# Patient Record
Sex: MALE | Race: WHITE | NOT HISPANIC OR LATINO | URBAN - METROPOLITAN AREA
[De-identification: names, ages, dates, MRNs, and addresses within clinical notes are randomized per-mention and may not be internally consistent; named-entity substitution may affect disease eponyms.]

---

## 2023-10-04 ENCOUNTER — OFFICE VISIT (OUTPATIENT)
Dept: URGENT CARE | Facility: URGENT CARE | Age: 33
End: 2023-10-04
Payer: MEDICAID

## 2023-10-04 VITALS
HEART RATE: 61 BPM | SYSTOLIC BLOOD PRESSURE: 116 MMHG | DIASTOLIC BLOOD PRESSURE: 72 MMHG | OXYGEN SATURATION: 100 % | WEIGHT: 187 LBS | TEMPERATURE: 97.6 F

## 2023-10-04 DIAGNOSIS — R36.9 PENILE DISCHARGE: Primary | ICD-10-CM

## 2023-10-04 LAB
ALBUMIN UR-MCNC: NEGATIVE MG/DL
APPEARANCE UR: CLEAR
BACTERIA #/AREA URNS HPF: ABNORMAL /HPF
BILIRUB UR QL STRIP: NEGATIVE
COLOR UR AUTO: YELLOW
GLUCOSE UR STRIP-MCNC: NEGATIVE MG/DL
HGB UR QL STRIP: NEGATIVE
KETONES UR STRIP-MCNC: NEGATIVE MG/DL
LEUKOCYTE ESTERASE UR QL STRIP: NEGATIVE
NITRATE UR QL: NEGATIVE
PH UR STRIP: 7 [PH] (ref 5–7)
RBC #/AREA URNS AUTO: ABNORMAL /HPF
SP GR UR STRIP: 1.02 (ref 1–1.03)
SQUAMOUS #/AREA URNS AUTO: ABNORMAL /LPF
UROBILINOGEN UR STRIP-ACNC: 0.2 E.U./DL
WBC #/AREA URNS AUTO: ABNORMAL /HPF

## 2023-10-04 PROCEDURE — 87591 N.GONORRHOEAE DNA AMP PROB: CPT | Performed by: PHYSICIAN ASSISTANT

## 2023-10-04 PROCEDURE — 81001 URINALYSIS AUTO W/SCOPE: CPT | Performed by: PHYSICIAN ASSISTANT

## 2023-10-04 PROCEDURE — 99204 OFFICE O/P NEW MOD 45 MIN: CPT | Mod: 25 | Performed by: PHYSICIAN ASSISTANT

## 2023-10-04 PROCEDURE — 87491 CHLMYD TRACH DNA AMP PROBE: CPT | Performed by: PHYSICIAN ASSISTANT

## 2023-10-04 PROCEDURE — 96372 THER/PROPH/DIAG INJ SC/IM: CPT | Performed by: PHYSICIAN ASSISTANT

## 2023-10-04 RX ORDER — CEFTRIAXONE SODIUM 1 G
500 VIAL (EA) INJECTION ONCE
Status: COMPLETED | OUTPATIENT
Start: 2023-10-04 | End: 2023-10-04

## 2023-10-04 RX ORDER — DOXYCYCLINE 100 MG/1
100 CAPSULE ORAL 2 TIMES DAILY
Qty: 14 CAPSULE | Refills: 0 | Status: SHIPPED | OUTPATIENT
Start: 2023-10-04 | End: 2023-10-11

## 2023-10-04 RX ADMIN — Medication 500 MG: at 18:52

## 2023-10-04 NOTE — LETTER
October 4, 2023      Jean Crooks  [address] [facility] [address]        To Whom It May Concern:    Jean Crooks  was seen on 10/4/23.  Please excuse him  from work until symptoms improve        Sincerely,        Max Hickman PA-C

## 2023-10-04 NOTE — NURSING NOTE
Clinic Administered Medication Documentation    Patient was given Rocephin. Prior to medication administration, verified patient's identity using patient's name and date of birth.    Jerome Chin CMA
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
## 2023-10-05 ENCOUNTER — TELEPHONE (OUTPATIENT)
Dept: URGENT CARE | Facility: URGENT CARE | Age: 33
End: 2023-10-05
Payer: MEDICAID

## 2023-10-05 LAB
C TRACH DNA SPEC QL NAA+PROBE: NEGATIVE
N GONORRHOEA DNA SPEC QL NAA+PROBE: POSITIVE

## 2023-10-05 NOTE — TELEPHONE ENCOUNTER
I called and spoke to pt.    Notified pt of provider's message.    Pt verbally okay. Thank you.    Jerome Chin CMA Mai See GINGER [place]  10/5/2023  5:51 PM CDT       Please inform patient that his gonorrhea test was positive and he was given the rocephin injection and the doxycycline which should cover this infection.  Abstain from intercourse for 1week until infection has cleared.  Your partners will need to be tested and/or treated as well.  Otherwise, you guys will be passing this infection back and forth.  Repeat testing in 1month to make sure that this infection had cleared.     Flor See GINGER [place]

## 2024-04-07 ENCOUNTER — HOSPITAL ENCOUNTER (EMERGENCY)
Facility: CLINIC | Age: 34
Discharge: HOME | End: 2024-04-07
Attending: EMERGENCY MEDICINE | Admitting: EMERGENCY MEDICINE
Payer: COMMERCIAL

## 2024-04-07 ENCOUNTER — HOSPITAL ENCOUNTER (OUTPATIENT)
Facility: CLINIC | Age: 34
Setting detail: OBSERVATION
Discharge: OTHER INSTITUTION | End: 2024-04-08
Attending: EMERGENCY MEDICINE | Admitting: EMERGENCY MEDICINE
Payer: COMMERCIAL

## 2024-04-07 VITALS
HEIGHT: 70 IN | BODY MASS INDEX: 28.88 KG/M2 | DIASTOLIC BLOOD PRESSURE: 93 MMHG | SYSTOLIC BLOOD PRESSURE: 138 MMHG | OXYGEN SATURATION: 97 % | RESPIRATION RATE: 18 BRPM | WEIGHT: 201.7 LBS | TEMPERATURE: 98.1 F | HEART RATE: 75 BPM

## 2024-04-07 DIAGNOSIS — F43.10 PTSD (POST-TRAUMATIC STRESS DISORDER): ICD-10-CM

## 2024-04-07 DIAGNOSIS — F41.1 GAD (GENERALIZED ANXIETY DISORDER): ICD-10-CM

## 2024-04-07 DIAGNOSIS — R45.851 SUICIDAL IDEATION: ICD-10-CM

## 2024-04-07 DIAGNOSIS — Z59.00 HOMELESSNESS: ICD-10-CM

## 2024-04-07 DIAGNOSIS — F33.1 MAJOR DEPRESSIVE DISORDER, RECURRENT EPISODE, MODERATE (H): Primary | ICD-10-CM

## 2024-04-07 DIAGNOSIS — Z71.1 MENTAL HEALTH-RELATED COMPLAINT: ICD-10-CM

## 2024-04-07 DIAGNOSIS — Z62.9 HISTORY OF ADVERSE CHILDHOOD EXPERIENCES: ICD-10-CM

## 2024-04-07 DIAGNOSIS — F32.A DEPRESSIVE DISORDER: ICD-10-CM

## 2024-04-07 PROBLEM — F31.9 BIPOLAR DISORDER (H): Status: ACTIVE | Noted: 2024-04-07

## 2024-04-07 PROBLEM — F41.9 ANXIETY: Status: ACTIVE | Noted: 2024-04-07

## 2024-04-07 PROCEDURE — 99284 EMERGENCY DEPT VISIT MOD MDM: CPT | Mod: 27

## 2024-04-07 PROCEDURE — 99284 EMERGENCY DEPT VISIT MOD MDM: CPT | Performed by: NURSE PRACTITIONER

## 2024-04-07 PROCEDURE — 99285 EMERGENCY DEPT VISIT HI MDM: CPT

## 2024-04-07 PROCEDURE — G0378 HOSPITAL OBSERVATION PER HR: HCPCS

## 2024-04-07 RX ORDER — GABAPENTIN 100 MG/1
200 CAPSULE ORAL 3 TIMES DAILY PRN
Status: DISCONTINUED | OUTPATIENT
Start: 2024-04-07 | End: 2024-04-08 | Stop reason: HOSPADM

## 2024-04-07 RX ORDER — OLANZAPINE 10 MG/2ML
10 INJECTION, POWDER, FOR SOLUTION INTRAMUSCULAR 2 TIMES DAILY PRN
Status: DISCONTINUED | OUTPATIENT
Start: 2024-04-07 | End: 2024-04-08 | Stop reason: HOSPADM

## 2024-04-07 RX ORDER — HYDROXYZINE HYDROCHLORIDE 50 MG/1
50 TABLET, FILM COATED ORAL EVERY 6 HOURS PRN
Status: DISCONTINUED | OUTPATIENT
Start: 2024-04-07 | End: 2024-04-08 | Stop reason: HOSPADM

## 2024-04-07 RX ORDER — OLANZAPINE 5 MG/1
5 TABLET, ORALLY DISINTEGRATING ORAL 2 TIMES DAILY PRN
Status: DISCONTINUED | OUTPATIENT
Start: 2024-04-07 | End: 2024-04-08 | Stop reason: HOSPADM

## 2024-04-07 RX ORDER — SERTRALINE HYDROCHLORIDE 100 MG/1
50 TABLET, FILM COATED ORAL DAILY
Qty: 30 TABLET | Refills: 0 | Status: SHIPPED | OUTPATIENT
Start: 2024-04-07 | End: 2024-04-08

## 2024-04-07 RX ORDER — TRAZODONE HYDROCHLORIDE 50 MG/1
50 TABLET, FILM COATED ORAL
Qty: 30 TABLET | Refills: 1 | Status: SHIPPED | OUTPATIENT
Start: 2024-04-07 | End: 2024-04-08

## 2024-04-07 RX ORDER — TRAZODONE HYDROCHLORIDE 50 MG/1
50 TABLET, FILM COATED ORAL
Status: DISCONTINUED | OUTPATIENT
Start: 2024-04-07 | End: 2024-04-08 | Stop reason: HOSPADM

## 2024-04-07 RX ORDER — ONDANSETRON 4 MG/1
4 TABLET, ORALLY DISINTEGRATING ORAL EVERY 6 HOURS PRN
Status: DISCONTINUED | OUTPATIENT
Start: 2024-04-07 | End: 2024-04-08 | Stop reason: HOSPADM

## 2024-04-07 RX ORDER — LANOLIN ALCOHOL/MO/W.PET/CERES
3 CREAM (GRAM) TOPICAL
Status: DISCONTINUED | OUTPATIENT
Start: 2024-04-07 | End: 2024-04-08 | Stop reason: HOSPADM

## 2024-04-07 RX ORDER — IBUPROFEN 600 MG/1
600 TABLET, FILM COATED ORAL EVERY 6 HOURS PRN
Status: DISCONTINUED | OUTPATIENT
Start: 2024-04-07 | End: 2024-04-08 | Stop reason: HOSPADM

## 2024-04-07 RX ORDER — ACETAMINOPHEN 325 MG/1
650 TABLET ORAL EVERY 4 HOURS PRN
Status: DISCONTINUED | OUTPATIENT
Start: 2024-04-07 | End: 2024-04-08 | Stop reason: HOSPADM

## 2024-04-07 SDOH — HEALTH STABILITY - MENTAL HEALTH: PROBLEM RELATED TO UPBRINGING, UNSPECIFIED: Z62.9

## 2024-04-07 SDOH — ECONOMIC STABILITY - HOUSING INSECURITY: HOMELESSNESS UNSPECIFIED: Z59.00

## 2024-04-07 ASSESSMENT — ACTIVITIES OF DAILY LIVING (ADL)
ADLS_ACUITY_SCORE: 35
ADLS_ACUITY_SCORE: 33
ADLS_ACUITY_SCORE: 33
ADLS_ACUITY_SCORE: 35

## 2024-04-07 ASSESSMENT — COLUMBIA-SUICIDE SEVERITY RATING SCALE - C-SSRS
2. HAVE YOU ACTUALLY HAD ANY THOUGHTS OF KILLING YOURSELF IN THE PAST MONTH?: YES
6. HAVE YOU EVER DONE ANYTHING, STARTED TO DO ANYTHING, OR PREPARED TO DO ANYTHING TO END YOUR LIFE?: YES
5. HAVE YOU STARTED TO WORK OUT OR WORKED OUT THE DETAILS OF HOW TO KILL YOURSELF? DO YOU INTEND TO CARRY OUT THIS PLAN?: NO
3. HAVE YOU BEEN THINKING ABOUT HOW YOU MIGHT KILL YOURSELF?: NO
4. HAVE YOU HAD THESE THOUGHTS AND HAD SOME INTENTION OF ACTING ON THEM?: NO
1. IN THE PAST MONTH, HAVE YOU WISHED YOU WERE DEAD OR WISHED YOU COULD GO TO SLEEP AND NOT WAKE UP?: YES

## 2024-04-07 NOTE — DISCHARGE INSTRUCTIONS
Resources for Patients Without Insurance:    [facility] ([facility]) [address] [address] Phone: [phone]    Hours: Monday and Thursday 10-11:30 am. Dr in [place] that is staffed with nursing students, nursing staff, student PA s and faculty.  Not a clinic, but focuses on basic healthcare and personal hygiene needs.  Closed when schools are close for the weather or on school breaks, so call ahead     [facility] [address] [address] Phone: [phone]    Hours: Monday through Friday 8 am-5 pm. [facility] clinic that offers free or low-cost (and sliding fee) health care.  Can help with getting set up with insurance. Offers a wide range of services including family planning (testing, morning after, birth control), pediatrics, counseling, dental for minors, etc.  Can call or walk in.     [facility] [address] [facility], 5th Floor, [facility] [address] Phone: [phone]    Hours: Monday-Friday 8 am-5 pm. Call for appointment. Healthcare for all ages      [facility] [address], [place] [address] Phone: [phone] Website: [email or web address]    Hours: By appointment only. (Some same-day appointments available.) Monday-Thursday 9 am-7 pm; Friday 9 am-4pm; Saturday 10 am-2 pm. Sliding fee scale.  Sexual health, women's health, family planning.       [facility] [address] [address] Phone: [phone]    Hours: Monday, Wednesday, Thursday and Friday 8 am-5 pm; Tuesday 8 am-8 pm. Sliding fee scale.  medical, dental, disease prevention, diabetic support and counseling     [facility] [address] [facility] [address] Phone: [phone], option 1    Hours: Walk-ins Monday, Wednesday, Thursday and Friday 8:30- 11:30 am and 1-3 pm; Tuesday 9:30-11:30 am and 1:45-3 pm. Sliding fee  scale. medical, dental, pediatrics, diabetic support and counseling     [facility] [address] [facility] [address] [facility] [address] [facility] [address] [facility] [address] [place] [address] [address] Phone: [phone]    Hours: Monday-Friday 8 am-5 pm. Sliding fee scale.  Cancer screens, CD and mental health services.  Basic medical for adults and peds.  Family planning, testing hypertension screenings.     [facility] [address] [facility] [address] Phone: For Appointments call [phone]    Hours: Monday-Friday 8 am-5 pm. Provides: provides a large range of medical services     [facility] [address] [place] [address]    Hours: Monday-Friday 8:30 am-5:30 pm.*      [facility] [address] [place] [address]    Hours: Monday-Friday 8:30 am-4:30 pm.   Clinic hours are subject to change as a result of the COVID-19 pandemic.    Phone: [phone]   Sliding fee scale. provides a wide range of medical services, including testing.  Sexual health, family planning, birth control     [facility] [address] [facility] [address] ([facility]) Phone: [phone] Website: [email or web address]    Hours: Monday and Thursday evenings 5:30-9 pm (except holidays) Provides: Testing, physicals, lab testing, help with setting up insurance through [place] and [facility].      [facility] [address] [address] Phone: [phone]    Hours: Monday, Tuesday, Thursday, and Friday 7:30 am-5 pm; Wednesday 7:30 am-7 pm. Sliding fee scale.  Sexual health, family planning, birth control.  Mental health      [facility] open at [address] Monday-Thursday 8 am-4 pm and Friday 8 am-12 pm.     [facility] Provides:  preventative health. Intake procedure: Call [phone] Monday through Friday 8 am-4 pm to make an appointment.     [facility] [address], [place] [address] Phone: [phone]    Hours: Monday-Friday 8 am-5 pm.  Sleep clinic.  Family medicine.  help with setting up insurance through [place] [facility].       [facility] - free drop-in health and wellness programming:  We ll have nursing staff on site from both [facility] and [facility] to provide services like weight and blood pressure checks, medication consultation, health education, and care  coordination. We ll have a number of well-being offerings on site, as well, including a massage therapist and access to exercise classes.  Address:  [address] [place] [address]  [place]  Phone: [phone]

## 2024-04-07 NOTE — PROGRESS NOTES
Jean Crooks  April 7, 2024  Plan of Care Hand-off Note     Patient Care Path: (P) observation    Plan for Care:   (P) Pt presents for worsening depression and suicidal ideation.  He would benefit from observation status for safety and stabilization.  Pt to initiate medication and needs assistance in establishing outpatient follow up.    Identified Goals and Safety Issues:      Overview:               Legal Status:  Voluntary    Psychiatry Consult:  Pending       Updated  Rn and NP regarding plan of care.           Antonieta Garcia, [facility]

## 2024-04-07 NOTE — PROGRESS NOTES
"34 year old male transferred to [facility] from ED due to suicidal ideation and re-establish mental healthcare after stopping all of his medications in 2021. Reports feeling like he wants to drive off of a bridge for the past 4 days.  He reports a history of 2 suicide attempts between the ages if [age] when he was living in a group home, no attempts in adulthood.  He denies HI, and hallucinations.  Pt does state that he \"argues with a 'dragon' that I built when I was in the group home\" as a \"PTSD coping mechanism.\"  He denies that the dragon commands him to do things or says negative things.  Pt endorses feeling increasingly depressed, anxious, and irritable.  Ranks his anxiety as 7/10 in intake, states his baseline is 5/10.  He endorses marijuana use, last use was yesterday.  Denies use of all other substances.  His goal for empath is to gain an understanding of what is going on with his mental health and connect with resources.  Cooperative with intake search, VS and assessments.  Nursing and risk assessments completed. Admission information reviewed with patient. [facility]. Questions regarding [facility] addressed. Pt safety search completed.    "

## 2024-04-07 NOTE — PROGRESS NOTES
Triage & Transition Services, Extended Care     Client Name: Jean Crooks    Date: April 7, 2024    Patient was seen    Mode of Assessment:      Service Type: (P) excused from group session  Session Start Time:  (P) 0920    Session End Time: (P) 0950  Session Length: (P) 30  Site Location: [facility] EMERGENCY DEPT                             EMP05  Total Number ofAttendees: (P) 4  Topic:   (P) coping skills/lifestyle management   Response:       Antonieta Garcia [facility]   Licensed Mental Health Professional (LMHP), Extended Care  [phone]

## 2024-04-07 NOTE — PROGRESS NOTES
Pt met with provider and wishes to discharge.  He is able to contract for safety.  Resources for free/reduced follow up were provided to pt.  Writer printed MNSure application for pt to apply.  Safety plan completed.  Pt to discharge home.    AMRA Fatima

## 2024-04-07 NOTE — PROGRESS NOTES
Discharge instructions reviewed with patient including follow-up care plan. Educated on medication regimen and advised not to stop prescribed medication without consulting their physician. Reviewed safety plan and outpatient resources. Pt has contracted for safety and completed safety plan with [facility].  Denies SI. All belongings which were brought into the hospital have been returned to patient. Escorted off the unit at  2:36pm accompanied by nursing.  Discharged to home in stable condition via personal vehicle.

## 2024-04-07 NOTE — ED PROVIDER NOTES
History     Chief Complaint:  Psychiatric Evaluation       The history is provided by the patient.      Jean Crooks is a 34 year old male who presents due to suicidal ideations. The patient reports that he wants to give up and for the past four days he has wanted to drive his car off a bridge. He also states that he has had difficulty sleeping due to stress. He notes that he has recently became unemployed and is currently living in his vehicle. The patient reports that he was previously on mental health medications including Seroquel, Ritalin, Wellbutrin, and Zyprexa but stopped taking these medications in 2021. He notes previous mental health diagnoses of Bipolar disorder, ADD, ADHD, anger management, and PTSD. He has had no previous mental health admissions. The patient reports marijuana use but denies other substance use.     Independent Historian:   None - Patient Only    Review of External Notes:   I reviewed the Urgent Care visit from 10/4/2023 with concern for sexually transmitted infection.       Medications:    The patient is currently on no regular medications.    Past Medical History:    Bipolar disorder  ADD   ADHD  PTSD    Physical Exam   Patient Vitals for the past 24 hrs:   BP Temp Temp src Pulse Resp SpO2   04/07/24 0731 118/77 97.5  F (36.4  C) Temporal 78 18 98 %        Physical Exam  General: Alert, interactive   Head:  Scalp is atraumatic  Eyes:  The pupils are equal, round, and reactive to light    EOM's intact    No scleral icterus  ENT:      Nose:  The external nose is normal  Ears:  External ears are normal  Mouth/Throat: Mucus membranes are moist       Neck:  Normal range of motion.      There is no rigidity.    Trachea is in the midline         CV:  Regular rate and rhythm    No murmur   Resp:  Breath sounds are clear bilaterally    Non-labored, no retractions or accessory muscle use     MS:  Normal strength in all 4 extremities  Skin:  Warm and dry, No rash or lesions noted.  Neuro:    Strength 5/5 x4.       GCS: 15  Psych: Awake. Alert.  Suicidal ideations    Appropriate interactions.    Emergency Department Course     Emergency Department Course & Assessments:    Assessments:  0749 I obtained history and examined the patient as noted above. I explained findings to the patient and we discussed plan for transfer to [facility]. The patient is comfortable with this plan.     Independent Interpretation (X-rays, CTs, rhythm strip):  None    Consultations/Discussion of Management or Tests:  None        Social Determinants of Health affecting care:   Employment/Unemployment, Homelessness/Housing Insecurity, Stress/Adjustment Disorders, and Medication noncompliance    Disposition:  The patient was transferred to [facility].     Impression & Plan    CMS Diagnoses: None    Medical Decision Making:  Jean Crooks is a 34 year old male who presents for evaluation of suicidal ideations.  There are no concerns for or signs at this point of suicide attempt or ingestion, no concerning findings on the remainder of physical exam. The patient is medically cleared and deemed a good candidate for [facility] for further psychiatric evaluation and care. They were in agreement and are transferred to the Em[facility] unit in stable condition.    Diagnosis:    ICD-10-CM    1. Mental health-related complaint  Z71.1       2. Homelessness  Z59.00         Scribe Disclosure:  I, Kristan Zoraida, am serving as a scribe at 7:38 AM on 4/7/2024 to document services personally performed by Jamal Palmer MD based on my observations and the provider's statements to me.     4/7/2024   Jamal Palmer MD Trigger, Brandon Thomas, MD  04/07/24 0866

## 2024-04-07 NOTE — ED NOTES
[facility]  ED to EMPATH Checklist:      Goal for EMPATH: Medication management    Current Behavior: Calm    Safety Concerns: None    Legal Hold Status: Voluntary    Medically Cleared by ED provider: Yes    Patient Therapeutically Searched: Therapeutic search by ED staff (strings, belts, shoes, pockets, electronics, etc.)    Belongings: Remain with patient    Independent Ambulation at Baseline: Yes/No: Yes    Participates in Care/Conversation: Yes/No: Yes    Patient Informed about EMPATH: Yes/No: Yes    DEC: Ordered and pending    Patient Ready to be Transferred to EMPATH? Yes/No: Yes
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
## 2024-04-08 VITALS
WEIGHT: 199.6 LBS | BODY MASS INDEX: 28.58 KG/M2 | TEMPERATURE: 98 F | DIASTOLIC BLOOD PRESSURE: 69 MMHG | OXYGEN SATURATION: 97 % | HEART RATE: 67 BPM | RESPIRATION RATE: 16 BRPM | HEIGHT: 70 IN | SYSTOLIC BLOOD PRESSURE: 125 MMHG

## 2024-04-08 PROCEDURE — 99222 1ST HOSP IP/OBS MODERATE 55: CPT | Performed by: PSYCHIATRY & NEUROLOGY

## 2024-04-08 PROCEDURE — 250N000013 HC RX MED GY IP 250 OP 250 PS 637: Performed by: NURSE PRACTITIONER

## 2024-04-08 PROCEDURE — G0378 HOSPITAL OBSERVATION PER HR: HCPCS

## 2024-04-08 RX ORDER — TRAZODONE HYDROCHLORIDE 50 MG/1
50 TABLET, FILM COATED ORAL
Qty: 15 TABLET | Refills: 0 | Status: SHIPPED | OUTPATIENT
Start: 2024-04-08

## 2024-04-08 RX ADMIN — SERTRALINE HYDROCHLORIDE 50 MG: 50 TABLET ORAL at 09:00

## 2024-04-08 ASSESSMENT — COLUMBIA-SUICIDE SEVERITY RATING SCALE - C-SSRS
ATTEMPT SINCE LAST CONTACT: NO
6. HAVE YOU EVER DONE ANYTHING, STARTED TO DO ANYTHING, OR PREPARED TO DO ANYTHING TO END YOUR LIFE?: NO
TOTAL  NUMBER OF ABORTED OR SELF INTERRUPTED ATTEMPTS SINCE LAST CONTACT: NO
TOTAL  NUMBER OF INTERRUPTED ATTEMPTS SINCE LAST CONTACT: NO
REASONS FOR IDEATION SINCE LAST CONTACT: MOSTLY TO END OR STOP THE PAIN (YOU COULDN'T GO ON LIVING WITH THE PAIN OR HOW YOU WERE FEELING)
2. HAVE YOU ACTUALLY HAD ANY THOUGHTS OF KILLING YOURSELF?: YES
5. HAVE YOU STARTED TO WORK OUT OR WORKED OUT THE DETAILS OF HOW TO KILL YOURSELF? DO YOU INTEND TO CARRY OUT THIS PLAN?: NO
1. SINCE LAST CONTACT, HAVE YOU WISHED YOU WERE DEAD OR WISHED YOU COULD GO TO SLEEP AND NOT WAKE UP?: YES
SUICIDE, SINCE LAST CONTACT: NO

## 2024-04-08 ASSESSMENT — ACTIVITIES OF DAILY LIVING (ADL)
ADLS_ACUITY_SCORE: 35

## 2024-04-08 NOTE — PROGRESS NOTES
"34 year old male with history of depression, PTSD received from ED due to inceased depression with SI. Reports feeling like something snapped in my brain. endorses SI. Nursing and risk assessments completed. Assessments reviewed with LMHP and physician. Admission information reviewed with patient. Patient given a tour of [facility] and instructions on using the facility. Questions regarding [facility] addressed. Pt safety search completed.        Patient states he left Emp[facility] unit at 1400 today went to a friends house and fell asleep and when he woke up he realized it was too late to  his new prescriptions or to call [place] [employer] to get insurance in place. He states this was very upsetting to him and brought up feelings of worthlessness and  states \"the stress just increased through the roof. And my head told me I can't do anything on my own and this is not going to end well.\" He states \"Something snapped in my brain today and I don;t want to do anything anymore. I just wanted to drive to [place] and have someone shoot me or just flip my car\"  States he is stressed and frustrated with being unemployed and homeless. States his funds are running low  as well and this is also increasing his stress  His affect is blunt and tense. Mood is irritable, depressed, frustrated. He is cooperative with staff,    "

## 2024-04-08 NOTE — ED NOTES
..[facility] [place]  ED to EMPATH Checklist:      Goal for EMPATH: Suicidality    Current Behavior: Sad and Cooperative    Safety Concerns: Suicidal, no plan    Legal Hold Status: Voluntary    Medically Cleared by ED provider: Yes    Patient Therapeutically Searched: N/A    Belongings: Remain with patient    Independent Ambulation at Baseline: Yes/No: Yes    Participates in Care/Conversation: Yes/No: Yes    Patient Informed about EMPATH: Yes/No: Yes    DEC: Ordered and pending    Patient Ready to be Transferred to EMPATH? Yes/No: Yes

## 2024-04-08 NOTE — ED TRIAGE NOTES
Pt c/o SI with no plan in place. Pt states he was just discharged from [facility] ED today. Pt seeking EMPATH     Triage Assessment (Adult)       Row Name 04/07/24 2100          Triage Assessment    Airway WDL WDL        Respiratory WDL    Respiratory WDL WDL        Skin Circulation/Temperature WDL    Skin Circulation/Temperature WDL WDL        Cardiac WDL    Cardiac WDL WDL        Peripheral/Neurovascular WDL    Peripheral Neurovascular WDL WDL        Cognitive/Neuro/Behavioral WDL    Cognitive/Neuro/Behavioral WDL WDL

## 2024-04-08 NOTE — CONSULTS
Diagnostic Evaluation Consultation  Crisis Assessment    Patient Name: Jean Crooks  Age:  34 year old  Legal Sex: male  Gender Identity: male  Pronouns:   Race: White  Ethnicity: Not  or   Language: English      Patient was assessed: In person      Patient location: [facility] EMERGENCY DEPT                             [facility]    Referral Data and Chief Complaint  Jean Crooks presents to the ED with family/friends. Patient is presenting to the ED for the following concerns: Suicidal ideation, Worsening psychosocial stress.   Factors that make the mental health crisis life threatening or complex are:  Pt reports returning to [facility] on 4/7/2024 after discharging on 4/7/2024 due to going to a friend's house and oversleeping, missing the time frame of getting medication from the pharmacy. 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 Pt reports he then started to have thoughts of life being easier if he was dead and had thoughts of crashing his car. Pt reports his friend then brought him back to [facility].      Informed Consent and Assessment Methods  Explained the crisis assessment process, including applicable information disclosures and limits to confidentiality, assessed understanding of the process, and obtained consent to proceed with the assessment.  Assessment methods included conducting a formal interview with patient, review of medical records, collaboration with medical staff, and obtaining relevant collateral information from family and community providers when available.  : done     Patient response to interventions: eager to participate, acceptance expressed  Coping skills were attempted to reduce the crisis:  Was voluntarily brought in by friend to return to [facility].     History of the Crisis   Per crisis assessment completed on 04/07/2024, Pt has been homeless due to lack  of employment or having the finances. Pt reports lack of social support here in [place] due to family being in [place]. Pt reports this crisis started after discharge from [facility] on 04/07/2024 and not being able to  medication in time prior to closing on Sunday.    Brief Psychosocial History  Family:  Single, Children no  Support System:  Parent(s), Limited to, Sibling(s)  Employment Status:  employment seeking  Source of Income:  none  Financial Environmental Concerns:  unable to afford food, unable to afford rent/mortgage  Current Hobbies:  exercise/fitness, social media/computer activities, outdoor activities, other (see comments)  Barriers in Personal Life:  financial concerns, lack of companionship, mental health concerns    Significant Clinical History  Current Anxiety Symptoms:  anxious  Current Depression/Trauma:  withdrawl/isolation, thoughts of death/suicide, sadness, hopelessness, helplessness  Current Somatic Symptoms:  anxious  Current Psychosis/Thought Disturbance:     Current Eating Symptoms:     Chemical Use History:  Alcohol: Social  Benzodiazepines: None  Opiates: None  Cocaine: None  Marijuana: Occasional  Other Use: None   Past diagnosis:  Bipolar Disorder, PTSD, ADHD  Family history:  No known history of mental health or chemical health concerns  Past treatment:  Individual therapy, Child Protection, Psychiatric Medication Management  Details of most recent treatment:  Not currently utilizing any mental health treatment.  Other relevant history:  Pt does not have health insurance at this time.       Collateral Information  Is there collateral information: No (No emergency contacts are listed. Per crisis assessment on 04/07/2024, Pt declined to provide any contact information.)        Risk Assessment  [place] Suicide Severity Rating Scale Full Clinical Version:  Suicidal Ideation  Q6 Suicide Behavior (Lifetime): yes          [place] Suicide Severity Rating Scale Recent:   Suicidal  Ideation (Recent)  Q1 Wished to be Dead (Past Month): yes  Q2 Suicidal Thoughts (Past Month): yes  Q3 Suicidal Thought Method: no  Q4 Suicidal Intent without Specific Plan: no  Q5 Suicide Intent with Specific Plan: no  Within the Past 3 Months?: yes  Level of Risk per Screen: high risk          Environmental or Psychosocial Events: work or task failure, loss of status/respect/rank, challenging interpersonal relationships, helplessness/hopelessness, unstable housing, homelessness, unemployment/underemployment, social isolation  Protective Factors: Protective Factors: sense of importance of health and wellness, able to access care without barriers, help seeking, sense of self-efficacy and/or positive self-esteem    Does the patient have thoughts of harming others? Feels Like Hurting Others: no  Previous Attempt to Hurt Others: no  Is the patient engaging in sexually inappropriate behavior?: no  Duty to warn initiated: no    Is the patient engaging in sexually inappropriate behavior?  no        Mental Status Exam   Affect: Blunted  Appearance: Appropriate  Attention Span/Concentration: Attentive  Eye Contact: Engaged    Fund of Knowledge: Appropriate   Language /Speech Content: Fluent  Language /Speech Volume: Normal  Language /Speech Rate/Productions: Normal  Recent Memory: Intact  Remote Memory: Intact  Mood: Sad, Anxious  Orientation to Person: Yes   Orientation to Place: Yes  Orientation to Time of Day: Yes  Orientation to Date: Yes     Situation (Do they understand why they are here?): Yes  Psychomotor Behavior: Normal  Thought Content: Suicidal  Thought Form: Intact          Medication  Psychotropic medications:   Medication Orders - Psychiatric (From admission, onward)      Start     Dose/Rate Route Frequency Ordered Stop    04/08/24 0800  sertraline (ZOLOFT) tablet 50 mg         50 mg Oral DAILY 04/07/24 2249      04/08/24 0000  sertraline (ZOLOFT) 50 MG tablet        Note to Pharmacy: Please send to ER/[facility]     50 mg Oral DAILY 04/08/24 1041      04/08/24 0000  traZODone (DESYREL) 50 MG tablet         50 mg Oral AT BEDTIME PRN 04/08/24 1041      04/07/24 2248  traZODone (DESYREL) tablet 50 mg         50 mg Oral AT BEDTIME PRN 04/07/24 2249      04/07/24 2248  OLANZapine zydis (zyPREXA) ODT tab 5 mg        See Hyperspace for full Linked Orders Report.    5 mg Oral 2 TIMES DAILY PRN 04/07/24 2249      04/07/24 2248  OLANZapine (zyPREXA) injection 10 mg        See Hyperspace for full Linked Orders Report.    10 mg Intramuscular 2 TIMES DAILY PRN 04/07/24 2249 04/07/24 2248  hydrOXYzine HCl (ATARAX) tablet 50 mg         50 mg Oral EVERY 6 HOURS PRN 04/07/24 2249               Current Care Team  Patient Care Team:  No Ref-Primary, Physician as PCP - General    Diagnosis  Patient Active Problem List   Diagnosis    PTSD (post-traumatic stress disorder)    Bipolar disorder (H)    Suicidal ideation    Anxiety       Primary Problem This Admission  Active Hospital Problems    Suicidal ideation      *Anxiety        Clinical Summary and Substantiation of Recommendations     Pt reports returning to [facility] on 4/7/2024 after discharging on 4/7/2024 due to going to a friend's house and oversleeping, missing the time frame of getting medication from the pharmacy. 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 Pt reports he then started to have thoughts of life being easier if he was dead and had thoughts of crashing his car. Pt reports his friend then brought him back to [facility].    After mental health crisis assessment, aftercare planning by [facility] care team, and consultation with attending provider, Pt's circumstances and mental state were safe for outpatient management. Pt denies any safety concerns at this time. Close follow-up with low cost/free resources in the community was recommended and information for [facility]  [facility], [facility] and crisis residence resources were provided. Pt is to return to the ED if any urgent or potentially life-threatening concerns arise.        At the time of discharge, Pt's acute suicide risk was determined to be low due to the following factors: reduction in the intensity of mood/anxiety symptoms that preceded the admission, denies feeling helpless or hopeless, not currently under the influence of alcohol or illicit substances, denies experiencing command hallucinations and no immediate access to firearms. Protective factors include: displays resiliency, future focused thinking, displays insight.      Patient coping skills attempted to reduce the crisis:  Was voluntarily brought in by friend to return to [facility].    Disposition  Recommended disposition: Medication Management, Individual Therapy, Other. please comment (crisis residence)        Reviewed case and recommendations with attending provider. Attending Name: Dr. Cheney       Attending concurs with disposition: yes       Patient and/or validated legal guardian concurs with disposition:   yes       Final disposition:  discharge    Legal status on admission: Voluntary/Patient has signed consent for treatment    Assessment Details   Total duration spent with the patient: 10 min     CPT code(s) utilized: Non-Billable    JUSTYNA Arriola, Psychotherapist  DEC - Triage & Transition Services  Callback: [phone]

## 2024-04-08 NOTE — DISCHARGE INSTRUCTIONS
[address] [facility]  [address] [place], [place]  -can assist with insurance application      [place] [id number] [facility] [place]  [address] [place] [address]  -see client handbook for services available, such as therapy, medication management

## 2024-04-08 NOTE — ED PROVIDER NOTES
"  History     Chief Complaint:  Suicidal       The history is provided by the patient.      Jean Crooks is a 34 year old male with a history of PTSD and bipolar disorder who presents with suicidal ideation. Patient was in the [facility] unit last night and was discharged this morning with a prescription for Zoloft. He was not able to pick this up today. He reports he recently lost his job and is living in his car. He has spent today attempting to apply for health insurance and other support, but was finding this very difficult. He states it felt like \"the easiest thing was to just not exist.\" He mentions a specific plan to run his car into a tree. He has had passive suicidal ideation in the past, but it was much more severe today. Denies use of alcohol or other drugs today.     Independent Historian:   None - Patient Only    Review of External Notes:   4/7/24: ED note reviewed     Medications:    Zoloft  Trazodone     Past Medical History:    PTSD  Bipolar disorder     Physical Exam   Patient Vitals for the past 24 hrs:   BP Temp Temp src Pulse Resp SpO2   04/07/24 2100 127/73 97.9  F (36.6  C) Temporal 63 16 99 %        Physical Exam  General: No acute distress.  Head: No obvious trauma to head.  Eyes:  Conjunctivae clear.   Neck: Normal range of motion.   CV: Skin is well perfused, no cyanosis  Respiratory: Effort normal. No audible wheezing.  Gastrointestinal: Non-distended.  Musculoskeletal: Normal range of motion. No gross deformities.  Neuro: Alert. Moving all extremities appropriately.  Normal speech.    Skin: No rashes or lesions on exposed skin.  Psych: Appears anxious, endorses suicidal ideation     Emergency Department Course   Emergency Department Course & Assessments:       Interventions:  Medications - No data to display     Independent Interpretation (X-rays, CTs, rhythm strip):  None    Assessments/Consultations/Discussion of Management or Tests:  ED Course as of 04/07/24 2147   Sun Apr 07, 2024 "   2130 I obtained the history and examined the patient as noted above.        Social Determinants of Health affecting care:   None    [facility]:  [facility]     Impression & Plan    Medical Decision Making:  Patient presents with worsening anxiety and suicidal ideation.  He reports he has been under a lot of stress, and is concerned that he is going to act out and harm himself.  He was recently in the empath unit, and thought he was feeling better and okay to go home, but states in hindsight he left too early.  He is calm and cooperative, and is agreeable to going to the empath unit.  He denies any alcohol or substance use.  He is transferred to the empath unit.    Diagnosis:    ICD-10-CM    1. Suicidal ideation  R45.851       2. Anxiety  F41.9            Scribe Disclosure:  I, Yessica Andre, am serving as a scribe at 9:44 PM on 4/7/2024 to document services personally performed by Arvind Koch MD based on my observations and the provider's statements to me.     4/7/2024   Arvind Koch MD Peery, Stephen, MD  04/08/24 0007

## 2024-04-08 NOTE — ED NOTES
Discharge instructions reviewed with patient including follow-up care plan. Educated on medication regime and advised not to stop prescribed medication without consulting their physician.Medications sent with the patient.  Reviewed safety plan and outpatient resources/appointments.Verbalized understanding of discharge instructions. Denies suicidal intent. All belongings which where brought into the hospital have been returned to patient. Escorted off the unit @ 5622 with staff.     Discharged to [facility] via cab.

## 2024-04-08 NOTE — ED PROVIDER NOTES
EmPATH Unit - Psychiatry  Combined Observation Note and Discharge Summary  [facility] Emergency Department  Observation Initiation Date: Apr 7, 2024    Jean Crooks MRN: [id number]   Age: 34 year old YOB: 1990     History     Chief Complaint   Patient presents with    Suicidal     HPI  Jean Crooks is a 34 year old male with a past history notable for a history of mood instability, PTSD, and ADHD who was recently discharged from the Em[facility] unit and returned later that evening reporting concern for reemerging symptoms.  He was determined to be medically stable then transferred to the EmPATH unit for psychiatric assessment.  He is now approaching 14 hours in the emergency department.  On examination, the patient explains that he became quite overwhelmed and frustrated when he encountered difficulties completing an online application intended on establishing health insurance in [place].  This triggered a heightened state of anxiety leading him to return to the emergency department for help.  He was not able to  his prescriptions after leaving and received his first dose of Zoloft this morning.  He is tolerating the medications without side effects.  He reviews with me having previously lived in [place] where he was receiving case management services, disability services, and residing in a group home.  He was diagnosed with a bipolar disorder and ADHD however is not confident in the diagnoses as he feels evaluations were very limited and had occurred when he was much younger.  He focused more on his anxiety, history of trauma, and frustrations related to establishing a sense of autonomy.  He discussed various challenges he has faced with finding stable employment working as a .  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  He characterized becoming easily frustrated when he is not able to  "achieve his goals.  He expressed some regret for leaving his prior residence in [place] where he was residing in a group home and working on progression towards independent housing.  He is hoping to reestablish services in [place], describing a desire for case management services, group home placement, supportive employment, and economic assistance.  He currently does not have outpatient mental health providers or an active .  Today, he denied suicidal and homicidal thoughts.  There was no indication of psychosis.  He interprets readiness to transition back to the community today.      Past Medical History  No past medical history on file.  No past surgical history on file.  sertraline (ZOLOFT) 50 MG tablet  traZODone (DESYREL) 50 MG tablet      Allergies   Allergen Reactions    Risperdal [Risperidone] Anaphylaxis     Family History  No family history on file.  Social History   Social History     Tobacco Use    Smoking status: Never    Smokeless tobacco: Never   Substance Use Topics    Alcohol use: Yes     Comment: occ    Drug use: Yes     Types: Marijuana          Review of Systems  A medically appropriate review of systems was performed with pertinent positives and negatives noted in the HPI, and all other systems negative.    Physical Examination   BP: 127/73  Pulse: 63  Temp: 97.9  F (36.6  C)  Resp: 16  Height: 177.8 cm (5' 10\")  Weight: 90.5 kg (199 lb 9.6 oz)  SpO2: 99 %    Physical Exam  General: Appears stated age.   Neuro: Alert and fully oriented. Extremities appear to demonstrate normal strength on visual inspection.   Integumentary/Skin: no rash visualized, normal color    Psychiatric Examination   Appearance: awake, alert  Attitude:  cooperative  Eye Contact:  fair  Mood:  angry, anxious, and sad   Affect:  mood congruent  Speech:  clear, coherent  Psychomotor Behavior:  no evidence of tardive dyskinesia, dystonia, or tics  Thought Process:  logical and linear  Associations:  no loose " associations  Thought Content:  no evidence of suicidal ideation or homicidal ideation and no evidence of psychotic thought  Insight:  fair  Judgement:  fair  Oriented to:  time, person, and place  Attention Span and Concentration:  fair  Recent and Remote Memory:  fair  Language: able to name/identify objects without impairment  Fund of Knowledge: intact with awareness of current and past events    ED Course     ED Course as of 04/08/24 1053   Sun Apr 07, 2024   2130 I obtained the history and examined the patient as noted above.        Labs Ordered and Resulted from Time of ED Arrival to Time of ED Departure - No data to display    Assessments & Plan (with Medical Decision Making)   Patient presenting with a heightened state of anxiety and feeling overwhelmed with tasks related to establishing health insurance and other social and economic supports that he is seeking.  Historically, he has utilized group homes and case management services to maintain some sense of autonomy however has been struggling to maintain a sense of stability since discontinuing these resources several years ago.  Attempts to transition him back to the community with resources led to a quick return to the emergency department as he was feeling overwhelmed by these burdens.  We will attempt to transition him to a crisis facility today for additional support. Nursing notes reviewed noting no acute issues.     I have reviewed the assessment completed by the [facility].     During the observation period, the patient did not require medications for agitation, and did not require restraints/seclusion for patient and/or provider safety.    The patient was found to have a psychiatric condition that would benefit from an observation stay in the emergency department for further psychiatric stabilization and/or coordination of a safe disposition. The observation plan includes serial assessments of psychiatric condition, potential administration of  medications if indicated, further disposition pending the patient's psychiatric course during the monitoring period.     Preliminary diagnosis:    ICD-10-CM    1. Suicidal ideation  R45.851       2. PTSD (post-traumatic stress disorder)  F43.10       3. FELIX (generalized anxiety disorder)  F41.1       4. Depressive disorder  F32.A     rule out bipolar disorder           Treatment Plan:  -Continue Zoloft 50 mg daily for antianxiety and antidepressant treatment.  Closely monitor mood stability given a history of bipolar disorder although there appears to be some question of whether this diagnosis was valid.  -Trazodone 50 mg nightly as needed for insomnia.  Risks and benefits were reviewed.  -Resources for outpatient mental health treatment including medication management and psychotherapy  -Resources for establishing case management services in the community  -The patient was educated on the option of pursuing psychological testing for additional diagnostic clarity once he is established health insurance.  -Transfer to a crisis facility today.    After the period in observation care, the patient's circumstances and mental state were safe for outpatient management. After counseling on the diagnosis, work-up, and treatment plan, the patient was discharged. Close follow-up with a psychiatrist and/or therapist was recommended and community psychiatric resources were provided. Patient is to return to the ED if any urgent or potentially life-threatening concerns.      At the time of discharge, the patient's acute suicide risk was determined to be low due to the following factors: Reduction in the intensity of mood/anxiety symptoms that preceded the admission, denial of suicidal thoughts, denies feeling helpless or helpless, not currently under the influence of alcohol or illicit substances, denies experiencing command hallucinations, no immediate access to firearms. The patient's acute risk could be higher if noncompliant  with their treatment plan, medications, follow-up appointments or using illicit substances or alcohol. Protective factors include: social supports    --  Jarod Cheney MD   [facility] EMERGENCY DEPT  EmPATH Unit        Jarod Cheney MD  04/08/24 9856

## 2025-04-30 ENCOUNTER — HOSPITAL ENCOUNTER (EMERGENCY)
Facility: CLINIC | Age: 35
Discharge: HOME | End: 2025-05-01
Attending: EMERGENCY MEDICINE

## 2025-04-30 DIAGNOSIS — R45.4 ANGER REACTION: ICD-10-CM

## 2025-04-30 DIAGNOSIS — R45.851 DEPRESSION WITH SUICIDAL IDEATION: ICD-10-CM

## 2025-04-30 DIAGNOSIS — F43.10 PTSD (POST-TRAUMATIC STRESS DISORDER): Primary | ICD-10-CM

## 2025-04-30 DIAGNOSIS — F32.A DEPRESSION WITH SUICIDAL IDEATION: ICD-10-CM

## 2025-04-30 PROCEDURE — 99285 EMERGENCY DEPT VISIT HI MDM: CPT

## 2025-04-30 RX ORDER — LORAZEPAM 1 MG/1
1 TABLET ORAL ONCE
Status: DISCONTINUED | OUTPATIENT
Start: 2025-04-30 | End: 2025-05-01 | Stop reason: HOSPADM

## 2025-04-30 ASSESSMENT — COLUMBIA-SUICIDE SEVERITY RATING SCALE - C-SSRS
2. HAVE YOU ACTUALLY HAD ANY THOUGHTS OF KILLING YOURSELF IN THE PAST MONTH?: YES
3. HAVE YOU BEEN THINKING ABOUT HOW YOU MIGHT KILL YOURSELF?: YES
1. IN THE PAST MONTH, HAVE YOU WISHED YOU WERE DEAD OR WISHED YOU COULD GO TO SLEEP AND NOT WAKE UP?: YES
5. HAVE YOU STARTED TO WORK OUT OR WORKED OUT THE DETAILS OF HOW TO KILL YOURSELF? DO YOU INTEND TO CARRY OUT THIS PLAN?: NO
4. HAVE YOU HAD THESE THOUGHTS AND HAD SOME INTENTION OF ACTING ON THEM?: YES
6. HAVE YOU EVER DONE ANYTHING, STARTED TO DO ANYTHING, OR PREPARED TO DO ANYTHING TO END YOUR LIFE?: YES

## 2025-04-30 ASSESSMENT — ACTIVITIES OF DAILY LIVING (ADL)
ADLS_ACUITY_SCORE: 41
ADLS_ACUITY_SCORE: 41

## 2025-05-01 ENCOUNTER — TELEPHONE (OUTPATIENT)
Dept: BEHAVIORAL HEALTH | Facility: CLINIC | Age: 35
End: 2025-05-01

## 2025-05-01 VITALS
DIASTOLIC BLOOD PRESSURE: 77 MMHG | HEART RATE: 62 BPM | RESPIRATION RATE: 18 BRPM | BODY MASS INDEX: 28.14 KG/M2 | SYSTOLIC BLOOD PRESSURE: 121 MMHG | WEIGHT: 190 LBS | OXYGEN SATURATION: 100 % | HEIGHT: 69 IN | TEMPERATURE: 98.4 F

## 2025-05-01 PROCEDURE — 99255 IP/OBS CONSLTJ NEW/EST HI 80: CPT | Performed by: PSYCHIATRY & NEUROLOGY

## 2025-05-01 PROCEDURE — 250N000013 HC RX MED GY IP 250 OP 250 PS 637: Performed by: PSYCHIATRY & NEUROLOGY

## 2025-05-01 RX ORDER — OLANZAPINE 10 MG/1
10 TABLET, ORALLY DISINTEGRATING ORAL 2 TIMES DAILY PRN
Status: DISCONTINUED | OUTPATIENT
Start: 2025-05-01 | End: 2025-05-01 | Stop reason: HOSPADM

## 2025-05-01 RX ORDER — OLANZAPINE 10 MG/1
10 TABLET, ORALLY DISINTEGRATING ORAL 2 TIMES DAILY PRN
Qty: 30 TABLET | Refills: 1 | Status: SHIPPED | OUTPATIENT
Start: 2025-05-01

## 2025-05-01 RX ORDER — OLANZAPINE 5 MG/1
5 TABLET, ORALLY DISINTEGRATING ORAL DAILY PRN
Status: DISCONTINUED | OUTPATIENT
Start: 2025-05-01 | End: 2025-05-01 | Stop reason: HOSPADM

## 2025-05-01 RX ORDER — LORAZEPAM 2 MG/1
2 TABLET ORAL EVERY 8 HOURS PRN
Status: DISCONTINUED | OUTPATIENT
Start: 2025-05-01 | End: 2025-05-01 | Stop reason: HOSPADM

## 2025-05-01 RX ADMIN — SERTRALINE HYDROCHLORIDE 50 MG: 50 TABLET ORAL at 11:34

## 2025-05-01 RX ADMIN — OLANZAPINE 5 MG: 5 TABLET, ORALLY DISINTEGRATING ORAL at 10:16

## 2025-05-01 ASSESSMENT — COLUMBIA-SUICIDE SEVERITY RATING SCALE - C-SSRS
TOTAL  NUMBER OF INTERRUPTED ATTEMPTS SINCE LAST CONTACT: NO
1. SINCE LAST CONTACT, HAVE YOU WISHED YOU WERE DEAD OR WISHED YOU COULD GO TO SLEEP AND NOT WAKE UP?: NO
2. HAVE YOU ACTUALLY HAD ANY THOUGHTS OF KILLING YOURSELF?: NO
ATTEMPT SINCE LAST CONTACT: NO
6. HAVE YOU EVER DONE ANYTHING, STARTED TO DO ANYTHING, OR PREPARED TO DO ANYTHING TO END YOUR LIFE?: NO
SUICIDE, SINCE LAST CONTACT: NO
TOTAL  NUMBER OF ABORTED OR SELF INTERRUPTED ATTEMPTS SINCE LAST CONTACT: NO

## 2025-05-01 ASSESSMENT — ACTIVITIES OF DAILY LIVING (ADL)
ADLS_ACUITY_SCORE: 41

## 2025-05-01 NOTE — ED NOTES
Patient refused changing into scrubs, belongings secured in locker in room. Call light and room phone remain on bedside table.

## 2025-05-01 NOTE — ED PROVIDER NOTES
Patient signed over to me by Dr. Lowry.  Please see the initial note for further details.  Plan at time of signout was inpatient admission and mental health evaluation by psychiatry.  Psychiatry was met with the patient and they recommended discharge home and discontinuance of the 7-year hold.  He is not actively homicidal and apparently made no homicidal threats.  Please see their documentation for further details.  Psychiatry does not feel the patient's holdable and is agreeable to medication outpatient follow-up.  Further care per please see their documentation for further care and steps.  He was in stable condition at time of discharge.     Rohit Huggins MD  05/01/25 3025

## 2025-05-01 NOTE — ED PROVIDER NOTES
"  Emergency Department Note      History of Present Illness     Chief Complaint   Mental Health Problem      HPI   Jean Crooks is a 35 year old male with a history of anxiety, bipolar disorder, PTSD, and suicidal ideation who presents to the emergency department today for evaluation of mental health problem. The patient reports experiencing a dispute with his landlord that led to a verbal altercation between the two. Since Jean reports that he would like to harm his landlord or has been having suicidal ideations. He reports he has several plans on how he would commit suicide, and that he has a history of suicide attempts. Jean reports he has not been able to take his medications in the last 2-3 months, and has instead been treating his anxiety with marijuana. He denies hearing voices or hallucinations.    Independent Historian   None    Review of External Notes       Past Medical History     Medical History and Problem List   Anxiety  Bipolar disorder  PTSD  Suicidal ideation    Medications   The patient is currently on no regular medications    Physical Exam     Patient Vitals for the past 24 hrs:   BP Temp Pulse Resp SpO2 Height Weight   04/30/25 2134 119/77 98.4  F (36.9  C) 78 18 99 % 1.753 m (5' 9\") 86.2 kg (190 lb)     Physical Exam  General: Patient is alert and loudly speaking and angry appearing  HEENT: Head atraumatic    Eyes: pupils equal and reactive. Conjunctiva clear   Nares: patent   Oropharynx: no lesions, uvula midline, no palatal draping, normal voice, no trismus  Neck: Supple without lymphadenopathy, no meningismus  Chest: Heart regular rate and rhythm.   Lungs: Equal clear to auscultation with no wheeze or rales  Abdomen: Soft, non tender, nondistended, normal bowel sounds  Back: No costovertebral angle tenderness, no midline C, T or L spine tenderness  Neuro: Grossly nonfocal, normal speech, strength equal bilaterally, CN 2-12 intact  Extremities: No deformities, equal radial and DP pulses. " No clubbing, cyanosis.  No edema  Skin: Warm and dry with no rash.       Diagnostics     Lab Results   Labs Ordered and Resulted from Time of ED Arrival to Time of ED Departure - No data to display    Imaging   No orders to display     Independent Interpretation   None    ED Course      Medications Administered   Medications - No data to display    Procedures   Procedures     Discussion of Management   None    ED Course    Patient was seen and evaluated by me.  DEC consult was ordered.   0145 patient continues to wait for DEC consult.  Care of the patient be signed out to my partner Dr. Lowry pending DEC assessment    Additional Documentation  None    Medical Decision Making / Diagnosis     CMS Diagnoses: None    MIPS       None    MDM   Jean Crooks is a 35 year old male history of chronic daily suicidal thoughts who presents to the emergency department after a verbal altercation with his landlord.  Patient became quite agitated.  He came to the hospital as he was worried he might attacked the landlord or hurt himself due to his anger and impulse rage.  Patient's cooperative here.  He is talking loudly but is directable and calm with the staff.  Medically screened and cleared by me.  Agrees to meet with DEC for resources as he is not currently on medication management for his depression nor seeing a therapist.  Patient contracts for safety here in the emergency department.  Patient is here voluntarily.  DEC consult pending at this time.  Care of the patient was signed out to my partner Dr. Lowry pending DEC assessment and final disposition planning with DEC.    Disposition   Care of the patient was transferred to my colleague Dr. Lowry pending DEC assessment.     Diagnosis     ICD-10-CM    1. Anger reaction  R45.4       2. Depression with suicidal ideation  F32.A     R45.851            Discharge Medications   New Prescriptions    No medications on file     Scribe Disclosure:  Yaya BAUER, am  serving as a scribe at 10:08 PM on 4/30/2025 to document services personally performed by Joycelyn Sanchez MD based on my observations and the provider's statements to me.        Joycelyn Sanchez MD  05/01/25 0148

## 2025-05-01 NOTE — ED NOTES
IP MH Referral Acuity Rating Score (RARS)    LMHP complete at referral to IP MH, with DEC; and, daily while awaiting IP MH placement. Call score to PPS.  CRITERIA SCORING   New 72 HH and Involuntary for IP MH (not adolescent) 3/3   Boarding over 24 hours 0/1   Vulnerable adult at least 55+ with multiple co morbidities; or, Patient age 11 or under 0/1   Suicide ideation without relief of precipitating factors 1/1   Current plan for suicide 0/1   Current plan for homicide 0/1   Imminent risk or actual attempt to seriously harm another without relief of factors precipitating the attempt 1/1   Severe dysfunction in daily living (ex: complete neglect for self care, extreme disruption in vegetative function, extreme deterioration in social interactions) 0/1   Recent (last 2 weeks) or current physical aggression in the ED 0/1   Restraints or seclusion episode in ED 0/1   Verbal aggression, agitation, yelling, etc., while in the ED 1/1   Active psychosis with psychomotor agitation or catatonia 0/1   Need for constant or near constant redirection (from leaving, from others, etc).  0/1   Intrusive or disruptive behaviors 0/1   TOTAL 6
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
## 2025-05-01 NOTE — DISCHARGE INSTRUCTIONS
Mental health recommendations    Please follow-up with resources listed below.    2.  One of our care coordinators will reach out to you after your discharge.  If you have any questions about additional resources please call our coordinators at # [phone]. If you would like to schedule an appointment for therapy or psychiatry  please call our Behavioral Access Scheduling office at [phone].      3.  Please use aftercare plan and already established coping skills and safety planning as needed.     4.  Please call 911 and/or return to the emergency department if her symptoms worsen or your safety becomes compromised. [facility] Adult crisis line, [phone]      [employer]    https://[employer]/    Address: [address] [place] [address]    Phone: [phone]    Email: info@[employer] is committed to improving healthcare access for all [facility], particularly those facing barriers. Our [facility] Certified Navigators offer free application and renewal assistance over the phone and in-person to all [place] across the [facility].    As [employer] s largest navigator and enrollment partner in [place], we are expanding coverage access through awareness, outreach, collaboration, and enrollment assistance.    In addition to [facility], we also work with other state agencies, [facility], health plans, and other entities to improve health access and outcomes for hard to reach communities.       Need help with  Insurance?    Please reach out to [employer] at [phone] or visit [email or web address] to apply online.     Need to apply for financial aid?     Call [employer] business office at [phone]  If you still have questions about applying for Medical Assistance coverage or financial aid through [employer], please contact the Financial Counseling team at [phone]    Do you have questions about your bill?     If you have questions about a bill you received for your hospital  stay please call our business office at [phone]          HOME Line Free Legal Help for Renters   If you need legal advice, please call our free tenant hotline (690-261-3484) or email an  here.     HOME Line provides free and low-cost legal, organizing, education, and advocacy services so that tenants throughout [place] can solve their own rental housing problems. We work to improve public and private policies relating to rental housing by involving affected tenants in the process.  For English, call [phone] or email your question to one of our housing attorneys.  Toll-free from [facility]: [phone]      [address] [place] [address] Triage Open Monday through Friday     Medical and Behavioral Health Triage is a new service at [address] [place] [address] that brings together community-based mental health agencies, [facility] , and [facility] ([facility]) to address our clients' complex needs. The goal of this program is to reach people that are not already receiving services or that are not already connected to case management who have an urgent concern related to their mental health or substance use disorder.   Care coordinators, peer recovery specialists, and health professionals at [address] [place] [address] will address clients' physical health, mental health, addiction issues - and also the wrap-around services that they need to stay healthy, including housing, health insurance, and other resources.   Triage is in N141 at [address] [place] [address]. It is accessible by police from the parking lot. Everyone else can come through the front door of [address] [place] [address], and follow signs to N141.     Triage hours:9:000 am - 5:00 pm  Triage Phone Number: [phone]  Location: [address] [place] [address]--        Case management:     [facility]    Call [facility] Front Door at [phone].    You can call that number Monday through Friday, 8 a.m. to 4 p.m. except holidays.    You will speak with someone to help you  identify your needs and offer options for service.    We may refer you to services or agencies for treatment and support. These could include:    Adult rehabilitative mental health services (ARMHS)  Community support program access  Intensive residential treatment  Outpatient mental health services  Case management  Assertive Community Treatment (ACT)  Supportive housing  Supportive employment  Chemical health assessment and treatment  Connections to resources in the community               Sliding Scale Resources:     [facility] ([facility])  Website: [email or web address]://[email or web address]/  Services: Medical care, dental care, mental health care, legal services, and care coordination services.   Location(s): [address] [place] [address]  Phone: [phone]    [facility]  Website: https://www.[facility].org/cost/sliding-fee-discount/  Services: Medical Care and Therapy  Location(s): Several clinics around the [facility]  Phone: [phone]    [facility]  Website: [email or web address]  Services: Free counseling (M,W,F: 1:00p-3:00p for in-person/virtual, M,T,W,Th: 5:30p-7:30p for virtual)  Location(s): [address] [place] [address]    Phone: [phone]    [facility] [place]  Website: https://www.[place]./[facility]/health-medical/mental-health-center  Services: Assessments, therapy, medication services, care coordination/resource assistance  Location(s): [address] [place] [address]  Phone: [phone]    [facility]  Website: https://[facility]-[place].com/  Services: Outpatient mental health services for people covered by [place] Health Care Programs or private insurance. People without insurance may be eligible to receive services on a sliding fee schedule.  Location(s): [facility], [place], [facility], [facility], [facility]  Phone: [phone]      [place] [facility]  Website: [email or web address]://[facility].com/  Services: Outpatient mental health services for people covered by [place] Health [place] Programs or private insurance. People without insurance may be eligible to receive services on a sliding fee schedule.  Location(s): [place], [place], [place], [place], [place], [facility]  Phone: [phone]    National [place] on Mental Illness of [place] (MIKE [place])  Website: https://namn.org/  Services: Classes and support groups are offered free of charge. Please check the website for up-to-date information on parent resource groups, classes and workshops.    Location(s): [address] [facility], [address] [place] [address]  Phone: [phone] // 4-737-KQPU-HELPS     [facility]:  Website: [email or web address]://www.[email or web address]/  Services: Will help get connected with mental health and physical health services, set up and access insurance, access safe and stable housing, and offers case management services to help you stay on track to reach personal goals.  Location(s): [address] [place] [address]  Phone: [phone]       [facility] ([facility])   Website: https://www.[facility]/[employer]/[place]/  Services: Drop in center that is staffed with nursing students, nursing staff, student PA s and faculty.  Not a clinic, but focuses on basic healthcare and personal hygiene needs. Closed when schools are closed for the weather or on school breaks, so call ahead. Monday and Thursday 10-11:30 am.    Location(s): [address] [address]   Phone: [phone]      [facility]   Website: https://www.[facility].org/clinic/[facility]/  Services: Monday-Friday 8 am-5 pm. Call for appointment. Healthcare for all ages    Location(s): [address] [facility], [facility] [address]   Phone: [phone]       Family  [facility]   Website: [email or web address]    Services: By appointment only. (Some same-day appointments available.) Monday-Thursday 9 am-7 pm; Friday 9 am-4pm; Saturday 10 am-2 pm. Sliding fee scale.  Sexual health, women's health, family planning.   Location(s): [address], [place] [address]  Phone: [phone]     [facility]   Website: [email or web address]  Services: Monday-Friday 8 am-5 pm. Sliding fee scale. Cancer screens, CD and mental health services.  Basic medical for adults and peds.  Family planning, testing hypertension screenings.   Location(s): [address] [facility] [address] [facility] [address] [facility] [address] [place] [address] [address] [place] [address]   Phone: [phone]      [facility]   Website: [email or web address]://[email or web address]/  Services: Monday-Friday 8 am-5 pm. Provides: provides a large range of medical services   Location(s): [address] [facility] [address]   Phone: [phone]    [facility]   Website: [email or web address]://[email or web address]/  Services: Medical Care, Dental Care, Behavioral Health, and Supportive Services  Location(s): [facility] [address] [place] [address] & [address] [place] [address]  Phone: [phone]      [facility] [place]   Website: [email or web address].[email or web address]  Services: Our goal in making PNC services available to our patients is to provide everyone the opportunity to receive free, quality health care regardless of their insurance situation, citizenship status, or financial background. Medical Services, Dental, Mental Health, Nutrition, Lab Services, Legal Services, and Pharmaceutical Care.   Location(s): [address] [address] ([facility])  Phone: [phone]      [facility]    Website: [email or web address]://www.[facility].[email or web address]/  Services: Medical, Dental, Behavioral Health, Vision, and MNSure enrollment assistance.   Location(s):    Medical & Behavioral Health: [address] [place] [address]   Dental & Vision: [address] [place] [address]  Phone:   Medical: [phone]  Dental: [phone]  Vision: [phone]  Behavioral Health: [phone]     [facility]   Website: [email or web address]://www.[email or web address]  Services: Services provided by [facility] include the patient s visit to the clinic, lab tests, xrays, diagnostic tests, and most medications. Specialty referrals are provided to the patient without charge through coordinated efforts with area health care providers.   Location(s): [address] [place] [address]  Phone: [phone]     [facility]   Website: https://www.[facility]/clinic/[facility]-and-pharmacy/  Services: See website for full list  - Family Medicine (primary care for all genders and all ages)  - Women s Care - Obstetrics / Gynecology  - Lactation Consultants  - Pediatrics (care for infants, children, adolescents)  - Integrative Health  - Chiropractic  - Acupuncture  - Integrative Primary Care  - Financial Counseling  - Diagnostic Imaging  - Lab Services  Location(s): [address] [place], [address]  Phone: [phone]      [facility]  Website: [email or web address]  Discounted mediation at certain pharmacies       The following DBT skills can assist me when: I want to act on your emotions and acting on them will only make things worse, I am overwhelmed by my emotions, I want to try to be skillful and not act on self destructive behavior.     Reduce Extreme Emotion  QUICKLY:  Changing Your Body Chemistry       T:  Change your body Temperature to change your autonomic nervous system    Use Ice pack to calm yourself down FAST. Place ice pack  underneath your eyes for a count of 30 seconds to initiate the divers reflex which will naturally calm down your heart rate and breathing.      I:  Intensely exercise to calm down a body revved up by emotion    Examples: running, walking fast, jumping, playing basketball, weight lifting, swimming, calisthenics, etc.      Engage in exercises that DO NOT include violent behaviors. Exercises that utilize violent behaviors tend to function as  behavioral rehearsal,  and rather than calming the person down, may actually  rev  the person up more, increasing the likelihood of violence, and lessening the likelihood that they will  burn off  energy     P:  Progressively relax your muscles    Starting with your hands, moving to your forearms, upper arms, shoulders, neck, forehead, eyes, cheeks and lips, tongue and teeth, chest, upper back, stomach, buttocks, thighs, calves, ankles, feet      Tense (10 seconds,   of the way), then relax each muscle (all the way)    Notice the tension    Notice the difference when relaxed (by tensing first, and then relaxing, you are able to get a more thorough relaxation than by simply relaxing)      P: Paced breathing to relax    The standard technique is to begin with counting the number of steps one takes for a typical inhale, then counting the steps one takes for a typical exhale, and then lengthening the amount of steps for the exhalation by one or two steps.  OR repeat this pattern for 1-2 minutes:   Inhale for four (4) seconds    Exhale for six (6) to eight (8) seconds        After using Distress Tolerance TIPP, TRY TO STOP!     S- Stop    Do not just react on your emotion urge. Stop! Freeze! Do not move a muscle! Your emotions may try to make you act without thinking. Stay in control! Take a step back Take a step back from the situation.    T- Take a break    Let go. Take a deep breath. Do not let your feelings make you act impulsively.    O- Observe    Notice what is going on inside  and outside you. What is the situation? What are your thoughts and feelings? What are others saying or doing? Does my emotion make sense, is it justified? What is it that my emotions want me to do? Would that be effective?    P- Proceed mindfully    Act with awareness. In deciding what to do, consider your thoughts and feelings, the situation, and other people s thoughts and feelings. Think about your goals. Ask Wise Mind: Which actions will make it better or worse?        If my emotion action urge would not be effective or helpful, practice acting OPPOSITE to the EMOTION ACTION URGE can help reduce the intensity or even change the emotion.   Consider these examples: with FEAR we have the urge to run away/avoid. OPPOSITE would be to approach it with caution. ANGER we have the urge to attack. OPPOSITE would be to gently avoid or to demonstrate kindness towards it. SADNESS we have the urge to withdraw/isolate. OPPOSITE would be to get self to move and be active physically or socially.      These additional skills may help with self-soothing and distracting you:      Activities   Focus attention on a task you need to get done. Rent movies; watch TV. Clean a room in your house. Find an event to go to. Play computer games. Go walking. Exercise. Surf the Internet. Write e-mails. Play sports. Go out for a meal or eat a favorite food. Call or go out with a friend. Listen to your iPod; download music. Build something. Spend time with your children. Play cards. Read magazines, books, comics. Do crossword puzzles or Sudoku.     Emotions   Read emotional books or stories, old letters. Watch emotional TV shows; go to emotional movies. Listen to emotional music. (Be sure the event creates different emotions.) Ideas: Scary movies, joke books, comedies, funny records, Adventism music, soothing music or music that fires you up, going to a store and reading funny greeting cards.     Thoughts   Count to 10; count colors in a painting  or poster or out the window; count anything. Repeat words to a song in your mind. Work puzzles. Watch TV or read.     Sensations   Squeeze a rubber ball very hard. Listen to very loud music. Hold ice in your hand or mouth. Go out in the rain or snow. Take a hot or cold shower.   Remember that you can use your 5 senses as helpful self-soothing tools!       I can help my own emotions by practicing the following to keep my emotional mind healthy and bring positive emotions:     The ABC PLEASE skill is about taking good care of ourselves so that we can take care of others. Also, an important component of DBT is to reduce our vulnerability. When we take good care of ourselves, we are less likely to be vulnerable to disease and emotional crisis.     ABC   A- Accumulate positive emotions by doing things that are pleasant.   B- Build mastery by doing things we enjoy. Whether it is reading, cooking, cleaning, fixing a car, working a cross word puzzle, or playing a musical instrument. Practice these things to  and in time we feel competent.   C- [place] Ahead by rehearsing a plan ahead of time so that we can be prepared to cope skillfully. (Think of what makes situations difficult, and what helps in those situations)      PLEASE   Treat Physical Illness and take medications as prescribed.   Balance eating in order to avoid mood swings.   Avoid mood-Altering substances and have mood control.   Maintain good sleep so you can enjoy your life.   Get exercise to maintain high spirits.        Aftercare Plan    If I am feeling unsafe or I am in a crisis, I will:   Contact my established care providers   Call the National Suicide Prevention Lifeline: 345.921.4363   Go to the nearest emergency room   Call 911   Your [facility] has a mental health crisis team you can call 24/7: [facility] Adult, [phone]    Warning signs that I or other people might notice when a crisis is developing for me: crying, feeling bad about  self    Things I am able to do on my own to cope or help me feel better:   -Practice square breathing when I begin to feel anxious - in breath through the nose for the count   of 4 and the first line on the square. Out breath through the mouth for the count of 4 for the second line   of the square. Repeat to complete the square. Repeat the square as many times as needed.    Things that I am able to do with others to cope or help me feel better: -Use community resources, including hotline numbers, [facility] crisis and support meetings    Things I can use or do for distraction: -Distraction skills of: going for walks, watching TV, spending time outside, calling a friend or family member  -Download a meditation regina and spend 15-20 minutes per day mediating/relaxing. Some apps to   download include: Calm, Headspace and Insight Timer. All 3 of these apps have free version    Changes I can make to support my mental health and wellness:   -Attend scheduled mental health therapy and psychiatric appointments and follow all recommendations  -Maintain a daily schedule/routine  -Practice deep breathing skills  -Abstain from all mood altering chemicals not currently prescribed to me     People in my life that I can ask for help: National [place] on Mental Illness (MIKE)  680.236.9927 or 1.888.MIKE.HELPS    Other things that are important when I m in crisis: -Commit to 30 minutes of self care daily - this can be as simple as taking a shower, going for a walk, cooking a meal, read, writing, etc        Crisis Lines  Crisis Text Line  Text 510032  You will be connected with a trained live crisis counselor to provide support.    Por espanol, texto  JENNIFER a 554897 o texto a 442-AYUDAME en WhatsApp    [place] Hope Line  1.800.SUICIDE [6239431]      Community Resources  Fast Tracker  Linking people to mental health and substance use disorder resources  fasttrackermn.org     [place] Mental Health Warm Line  Peer to peer support   "Monday thru Saturday, 12 pm to 10 pm  163.651.8419 or 7.129.313.1989  Text \"Support\" to 33575    National [place] on Mental Illness (MIKE)  753.627.9014 or 1.888.MIKE.HELPS        Mental Health Apps  My3  https://[employer].org/    VirtualHopeBox  https://[employer]/apps/virtual-hope-box/      Additional Information  Today you were seen by a licensed mental health professional through Triage and Transition services, Behavioral Healthcare Providers ([facility])  for a crisis assessment in the Emergency Department at [facility].  It is recommended that you follow up with your established providers (psychiatrist, mental health therapist, and/or primary care doctor - as relevant) as soon as possible. Coordinators from [facility] will be calling you in the next 24-48 hours to ensure that you have the resources you need.  You can also contact [facility] coordinators directly at [phone]. You may have been scheduled for or offered an appointment with a mental health provider. [facility] maintains an extensive network of licensed behavioral health providers to connect patients with the services they need.  We do not charge providers a fee to participate in our referral network.  We match patients with providers based on a patient's specific needs, insurance coverage, and location.  Our first effort will be to refer you to a provider within your care system, and will utilize providers outside your care system as needed.             "
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
